# Patient Record
Sex: FEMALE | Race: WHITE | NOT HISPANIC OR LATINO | Employment: UNEMPLOYED | URBAN - NONMETROPOLITAN AREA
[De-identification: names, ages, dates, MRNs, and addresses within clinical notes are randomized per-mention and may not be internally consistent; named-entity substitution may affect disease eponyms.]

---

## 2023-04-06 ENCOUNTER — HOSPITAL ENCOUNTER (INPATIENT)
Facility: HOSPITAL | Age: 38
LOS: 2 days | Discharge: HOME | End: 2023-04-08
Attending: PSYCHIATRY & NEUROLOGY | Admitting: PSYCHIATRY & NEUROLOGY
Payer: MEDICAID

## 2023-04-06 LAB
QT INTERVAL: 372 MS
QTC INTERVAL: 415 MS

## 2023-04-06 PROCEDURE — 93010 ELECTROCARDIOGRAM REPORT: CPT | Performed by: SPECIALIST

## 2023-04-06 PROCEDURE — 93005 ELECTROCARDIOGRAM TRACING: CPT | Performed by: PSYCHIATRY & NEUROLOGY

## 2023-04-06 PROCEDURE — 99223 1ST HOSP IP/OBS HIGH 75: CPT | Performed by: PSYCHIATRY & NEUROLOGY

## 2023-04-06 RX ORDER — ECHINACEA PURPUREA EXTRACT 125 MG
2 TABLET ORAL AS NEEDED
Status: DISCONTINUED | OUTPATIENT
Start: 2023-04-06 | End: 2023-04-08 | Stop reason: HOSPADM

## 2023-04-06 RX ORDER — OXCARBAZEPINE 300 MG/1
150 TABLET, FILM COATED ORAL 2 TIMES DAILY
Status: CANCELLED | OUTPATIENT
Start: 2023-04-06

## 2023-04-06 RX ORDER — LOPERAMIDE HYDROCHLORIDE 2 MG/1
2 CAPSULE ORAL
Status: DISCONTINUED | OUTPATIENT
Start: 2023-04-06 | End: 2023-04-08 | Stop reason: HOSPADM

## 2023-04-06 RX ORDER — BENZTROPINE MESYLATE 1 MG/1
1 TABLET ORAL 2 TIMES DAILY
Status: CANCELLED | OUTPATIENT
Start: 2023-04-06

## 2023-04-06 RX ORDER — RISPERIDONE 1 MG/1
1 TABLET ORAL 3 TIMES DAILY
Status: DISCONTINUED | OUTPATIENT
Start: 2023-04-06 | End: 2023-04-06

## 2023-04-06 RX ORDER — ALUMINA, MAGNESIA, AND SIMETHICONE 2400; 2400; 240 MG/30ML; MG/30ML; MG/30ML
15 SUSPENSION ORAL EVERY 6 HOURS PRN
Status: DISCONTINUED | OUTPATIENT
Start: 2023-04-06 | End: 2023-04-08 | Stop reason: HOSPADM

## 2023-04-06 RX ORDER — ESCITALOPRAM OXALATE 10 MG/1
10 TABLET ORAL DAILY
COMMUNITY

## 2023-04-06 RX ORDER — HYDROXYZINE 50 MG/1
50 TABLET, FILM COATED ORAL EVERY 6 HOURS PRN
Status: DISCONTINUED | OUTPATIENT
Start: 2023-04-06 | End: 2023-04-08 | Stop reason: HOSPADM

## 2023-04-06 RX ORDER — HYDROXYZINE HYDROCHLORIDE 25 MG/1
25 TABLET, FILM COATED ORAL 2 TIMES DAILY
Status: CANCELLED | OUTPATIENT
Start: 2023-04-06

## 2023-04-06 RX ORDER — TRAZODONE HYDROCHLORIDE 50 MG/1
50 TABLET ORAL NIGHTLY PRN
Status: DISCONTINUED | OUTPATIENT
Start: 2023-04-06 | End: 2023-04-08 | Stop reason: HOSPADM

## 2023-04-06 RX ORDER — IBUPROFEN 400 MG/1
400 TABLET ORAL EVERY 6 HOURS PRN
Status: DISCONTINUED | OUTPATIENT
Start: 2023-04-06 | End: 2023-04-08 | Stop reason: HOSPADM

## 2023-04-06 RX ORDER — BENZONATATE 100 MG/1
100 CAPSULE ORAL 3 TIMES DAILY PRN
Status: DISCONTINUED | OUTPATIENT
Start: 2023-04-06 | End: 2023-04-08 | Stop reason: HOSPADM

## 2023-04-06 RX ORDER — RISPERIDONE 1 MG/1
1 TABLET ORAL 3 TIMES DAILY
COMMUNITY
End: 2023-04-08 | Stop reason: HOSPADM

## 2023-04-06 RX ORDER — RISPERIDONE 1 MG/1
1 TABLET ORAL NIGHTLY
Status: DISCONTINUED | OUTPATIENT
Start: 2023-04-06 | End: 2023-04-07

## 2023-04-06 RX ORDER — ESCITALOPRAM OXALATE 10 MG/1
10 TABLET ORAL DAILY
Status: DISCONTINUED | OUTPATIENT
Start: 2023-04-06 | End: 2023-04-08 | Stop reason: HOSPADM

## 2023-04-06 RX ORDER — BENZTROPINE MESYLATE 1 MG/1
2 TABLET ORAL ONCE AS NEEDED
Status: DISCONTINUED | OUTPATIENT
Start: 2023-04-06 | End: 2023-04-08 | Stop reason: HOSPADM

## 2023-04-06 RX ORDER — BENZTROPINE MESYLATE 1 MG/ML
1 INJECTION INTRAMUSCULAR; INTRAVENOUS ONCE AS NEEDED
Status: DISCONTINUED | OUTPATIENT
Start: 2023-04-06 | End: 2023-04-08 | Stop reason: HOSPADM

## 2023-04-06 RX ORDER — NICOTINE 21 MG/24HR
1 PATCH, TRANSDERMAL 24 HOURS TRANSDERMAL
Status: DISCONTINUED | OUTPATIENT
Start: 2023-04-06 | End: 2023-04-07

## 2023-04-06 RX ORDER — ONDANSETRON 4 MG/1
4 TABLET, FILM COATED ORAL EVERY 6 HOURS PRN
Status: DISCONTINUED | OUTPATIENT
Start: 2023-04-06 | End: 2023-04-08 | Stop reason: HOSPADM

## 2023-04-06 RX ORDER — FAMOTIDINE 20 MG/1
20 TABLET, FILM COATED ORAL 2 TIMES DAILY PRN
Status: DISCONTINUED | OUTPATIENT
Start: 2023-04-06 | End: 2023-04-08 | Stop reason: HOSPADM

## 2023-04-06 RX ORDER — OXCARBAZEPINE 150 MG/1
150 TABLET, FILM COATED ORAL 2 TIMES DAILY
COMMUNITY
End: 2023-04-08 | Stop reason: HOSPADM

## 2023-04-06 RX ORDER — BENZTROPINE MESYLATE 1 MG/1
1 TABLET ORAL 2 TIMES DAILY
COMMUNITY
End: 2023-04-08 | Stop reason: HOSPADM

## 2023-04-06 RX ORDER — ACETAMINOPHEN 325 MG/1
650 TABLET ORAL EVERY 6 HOURS PRN
Status: DISCONTINUED | OUTPATIENT
Start: 2023-04-06 | End: 2023-04-08 | Stop reason: HOSPADM

## 2023-04-06 RX ORDER — HYDROXYZINE PAMOATE 25 MG/1
25 CAPSULE ORAL 2 TIMES DAILY
COMMUNITY

## 2023-04-06 RX ADMIN — RISPERIDONE 1 MG: 1 TABLET, FILM COATED ORAL at 20:50

## 2023-04-06 RX ADMIN — TRAZODONE HYDROCHLORIDE 50 MG: 50 TABLET ORAL at 20:50

## 2023-04-06 RX ADMIN — ESCITALOPRAM 10 MG: 10 TABLET, FILM COATED ORAL at 12:28

## 2023-04-06 NOTE — PLAN OF CARE
Goal Outcome Evaluation:  Plan of Care Reviewed With: patient  Patient Agreement with Plan of Care: agrees     Progress: no change       New admission, Pt has been oriented to the unit and discussed safety and rules.

## 2023-04-06 NOTE — PLAN OF CARE
Goal Outcome Evaluation:  Plan of Care Reviewed With: patient  Patient Agreement with Plan of Care: agrees     Progress: no change  Outcome Evaluation: Patient calm and cooperative. Rates anxiety a 7 and depression a 8. Reports auditory and tactile hallucinations. Reports suicidal thought with no plan and agrees to talk with staff if suicida thoughts worsen.
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
## 2023-04-06 NOTE — CONSULTS
"Visited with Alba briefly this afternoon. She expressed a desire to \"find her tigre again\", and we talked about what that would look like. I prayed with her and gave her a Bible. She thanked me for the visit. Will follow as needed.  "

## 2023-04-06 NOTE — H&P
"      INITIAL PSYCHIATRIC HISTORY & PHYSICAL    Patient Identification:  Name:  Alba Pantoja  Age:  37 y.o.  Sex:  female  :  1985  MRN:  [id number]   Visit Number:  [id number]  Primary Care Physician:  Provider, No Known    SUBJECTIVE    CC/Focus of Exam: Psychosis, SI    HPI: Alba Pantoja is a 37 y.o. female who was admitted on 2023 with complaints of psychosis and recent SI.  Patient directly admitted from outside hospital.  Patient mildly disorganized, exhibiting response lag, thought blocking, circumferential speech, paranoia and anxiety on exam today.  She is very vague on multiple questions.  She recently spent some time inpatient [facility] in [place].  She reports history of disorganization, paranoia, auditory hallucinations.  She was visiting family in [facility] when symptoms worsened over the past 2 days.    PAST PSYCHIATRIC HX:  Dx: PTSD, bipolar, schizophrenia  IP: 2 previous hospitalizations in [place]  OP: [facility] in [place]  Current meds: Per med rec  Previous meds: Multiple, cannot recall  SH/SI/SA: Endorses x3  Trauma: Endorsed, but did not want to elaborate    SUBSTANCE USE HX:  Endorsed history of polysubstance abuse, but none recently.  Smokes more than 1 pack/day.  Admission UDS negative    SOCIAL HX:  Lives in [facility].  Was visiting family in [place] before coming to the hospital.  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 No " pertinent surgical history.    No medications prior to admission.         ALLERGIES:  Penicillins    Temp:  [97.1 °F (36.2 °C)-98.4 °F (36.9 °C)] 97.1 °F (36.2 °C)  Heart Rate:  [80-84] 80  Resp:  [18] 18  BP: ()/(58-78) 93/58    REVIEW OF SYSTEMS:  Review of Systems   Psychiatric/Behavioral: Positive for confusion, dysphoric mood, hallucinations, sleep disturbance and suicidal ideas. The patient is nervous/anxious.    All other systems reviewed and are negative.       OBJECTIVE    PHYSICAL EXAM:  Physical Exam  Vitals and nursing note reviewed.   Constitutional:       Appearance: She is well-developed.   HENT:      Head: Normocephalic and atraumatic.      Right Ear: External ear normal.      Left Ear: External ear normal.      Nose: Nose normal.   Eyes:      Pupils: Pupils are equal, round, and reactive to light.   Pulmonary:      Effort: Pulmonary effort is normal. No respiratory distress.      Breath sounds: Normal breath sounds.   Abdominal:      General: There is no distension.      Palpations: Abdomen is soft.   Musculoskeletal:         General: No deformity. Normal range of motion.      Cervical back: Normal range of motion and neck supple.   Skin:     General: Skin is warm.      Findings: No rash.   Neurological:      Mental Status: She is alert and oriented to person, place, and time.      Coordination: Coordination normal.         MENTAL STATUS EXAM:   Hygiene:   fair  Cooperation:  Guarded  Eye Contact:  Fair  Psychomotor Behavior:  Slow  Affect:  Blunted  Hopelessness: 7  Speech:  Minimal  Thought Process: Disorganized  Thought Content:  Bizarre  Suicidal:  Suicidal Ideation  Homicidal:  None  Hallucinations:  Auditory  Delusion:  Paranoid  Memory:  Deficits  Orientation:  Person and Place  Reliability:  poor  Insight:  Poor  Judgment:  Poor  Impulse Control:  Poor      Imaging Results (Last 24 Hours)     ** No results found for the last 24 hours. **           No results found for: GLUCOSE, BUN,  CREATININE, EGFRIFNONA, EGFRIFAFRI, BCR, POTASSIUM, CO2, CALCIUM, PROTENTOTREF, ALBUMIN, LABIL2, BILIRUBIN, AST, ALT    No results found for: WBC, HGB, HCT, MCV, PLT    ECG/EMG Results (most recent)     Procedure Component Value Units Date/Time    ECG 12 Lead Other [[id number]] Collected: 04/06/23 0426     Updated: 04/06/23 0856     QT Interval 372 ms      QTC Interval 415 ms     Narrative:      Test Reason : Baseline Cardiac Status~  Blood Pressure :   */*   mmHG  Vent. Rate :  75 BPM     Atrial Rate :  75 BPM     P-R Int : 138 ms          QRS Dur :  86 ms      QT Int : 372 ms       P-R-T Axes :  67  73  62 degrees     QTc Int : 415 ms    Normal sinus rhythm with sinus arrhythmia  Normal ECG  No previous ECGs available  Confirmed by Kiki Santacruz ([id number]) on 4/6/2023 8:56:16 AM    Referred By: JOESPH           Confirmed By: Kiki Santacruz           Brief Urine Lab Results     None          Last Urine Toxicity    There is no flowsheet data to display.         Chart, notes, vitals, labs personally reviewed.  Outside [facility] report requested, reviewed, no controlled meds filled in [place] over the last year  UDS results:  Negative  EKG tracing personally reviewed, interpreted as normal sinus rhythm, QTc interval 415  Consulted with patient's therapist regarding clinical history and treatment plan    ASSESSMENT & PLAN:    Suicidal Ideation  -SI with plan  -Admit for crisis stabilization  -SP3    Unspecified psychotic disorder  -We will request records from psychiatric provider in [place]  -Change risperidone to 1 mg nightly  -Discontinue benztropine 1 mg twice daily  -Discontinue oxcarbazepine 150 mg twice daily  -We will establish outpatient psychiatric care following hospitalization    Posttraumatic stress disorder  -Medication as above  -Continue escitalopram 10 mg daily  -Outpatient care as above      The patient has been admitted for safety and stabilization.  Patient will be monitored for suicidality daily and  maintained on Special Precautions Level 3 (q15 min checks) .  The patient will have individual and group therapy with a master's level therapist. A master treatment plan will be developed and agreed upon by the patient and his/her treatment team.  The patient's estimated length of stay in the hospital is 5-7 days.

## 2023-04-07 PROCEDURE — 99232 SBSQ HOSP IP/OBS MODERATE 35: CPT | Performed by: PSYCHIATRY & NEUROLOGY

## 2023-04-07 RX ORDER — RISPERIDONE 1 MG/1
1 TABLET ORAL EVERY 12 HOURS SCHEDULED
Status: DISCONTINUED | OUTPATIENT
Start: 2023-04-07 | End: 2023-04-08 | Stop reason: HOSPADM

## 2023-04-07 RX ADMIN — ESCITALOPRAM 10 MG: 10 TABLET, FILM COATED ORAL at 08:35

## 2023-04-07 RX ADMIN — RISPERIDONE 1 MG: 1 TABLET, FILM COATED ORAL at 13:31

## 2023-04-07 RX ADMIN — RISPERIDONE 1 MG: 1 TABLET, FILM COATED ORAL at 20:18
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
## 2023-04-07 NOTE — PLAN OF CARE
Goal Outcome Evaluation:  Plan of Care Reviewed With: patient  Patient Agreement with Plan of Care: agrees     Progress: improving       Pt was calm and cooperative this evening. Pt reported anxiety 5/10 and depression 8/10. Pt reports having auditory hallucinations throughout the day but denied SI and HI. Pt spent most of the evening in the day room watching television.

## 2023-04-07 NOTE — PLAN OF CARE
Goal Outcome Evaluation:  Plan of Care Reviewed With: patient  Patient Agreement with Plan of Care: agrees     Progress: improving   Patient rates anxiety 4 and depression 5, denies thoughts of harming self and others and denies hallucinations.
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
## 2023-04-08 VITALS
SYSTOLIC BLOOD PRESSURE: 105 MMHG | OXYGEN SATURATION: 98 % | HEIGHT: 68 IN | DIASTOLIC BLOOD PRESSURE: 65 MMHG | WEIGHT: 144.8 LBS | BODY MASS INDEX: 21.95 KG/M2 | HEART RATE: 79 BPM | RESPIRATION RATE: 18 BRPM | TEMPERATURE: 97.7 F

## 2023-04-08 PROCEDURE — 99238 HOSP IP/OBS DSCHRG MGMT 30/<: CPT | Performed by: PSYCHIATRY & NEUROLOGY

## 2023-04-08 RX ORDER — RISPERIDONE 1 MG/1
1 TABLET ORAL 2 TIMES DAILY
Qty: 60 TABLET | Refills: 0 | Status: SHIPPED | OUTPATIENT
Start: 2023-04-08

## 2023-04-08 RX ORDER — RISPERIDONE 1 MG/1
1 TABLET ORAL 2 TIMES DAILY
Qty: 60 TABLET | Refills: 0 | Status: SHIPPED | OUTPATIENT
Start: 2023-04-08 | End: 2023-04-08 | Stop reason: SDUPTHER

## 2023-04-08 RX ADMIN — RISPERIDONE 1 MG: 1 TABLET, FILM COATED ORAL at 09:34

## 2023-04-08 RX ADMIN — ESCITALOPRAM 10 MG: 10 TABLET, FILM COATED ORAL at 09:34
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
## 2023-04-08 NOTE — PLAN OF CARE
Goal Outcome Evaluation:  Plan of Care Reviewed With: patient  Patient Agreement with Plan of Care: agrees        Outcome Evaluation: Patient calm and cooperative. Denied CRISTIAN, HI, CHRIS.

## 2023-04-08 NOTE — DISCHARGE SUMMARY
"      PSYCHIATRIC DISCHARGE SUMMARY     Patient Identification:  Name:  Alba Pantoja  Age:  37 y.o.  Sex:  female  :  1985  MRN:  [id number]  Visit Number:  [id number]      Date of Admission:2023   Date of Discharge:  2023    Discharge Diagnosis:  Chronic PTSD  Brief psychotic disorder        Admission Diagnosis:  Suicidal ideation [R45.851]     Hospital Course  Patient is a 37 y.o. female presented with psychosis and recent suicidal ideation.      Patient was admitted for crisis stabilization.  Patient was evaluated by psychiatrist on a daily basis and had the opportunity for both group and individual therapy by master's level therapist.  Routine laboratory studies and EKG were performed.    Patient is on Risperdal was changed to 1 mg twice daily and Cogentin and Trileptal were discontinued.  Home Lexapro was continued.  Patient improved in psychotic symptoms and mood symptoms over the duration of her stay.  She was not having any concerning or bizarre behavior during examination and her evaluation and conversation was normal.  She did have high anxiety and reported multiple stressors and did get tangential at times due to anxiety and racing thoughts but was improving and denied adamantly and convincingly SI/HI/AVH.  She was considering discharge yesterday but did not have a safe and appropriate discharge plan but is able to get back home to [place] today via [employer] and with her improvement in symptoms and benign exam, was found appropriate for discharge.  She was encouraged to follow-up with mental health care including therapy at discharge and patient was understanding and amenable to this.      Mental Status Exam upon discharge:   Mood \"Just nervous\"   Affect-congruent, appropriate, stable  Thought Content-goal directed, no delusional material present  Thought process-linear, organized, improved  Suicidality: No SI  Homicidality: No HI  Perception: No AH/VH  Insight and Judgement: Fair "     Procedures Performed         Consults:   Consults     No orders found from 3/8/2023 to 4/7/2023.          Pertinent Test Results:  Lab Results (last 72 hours)     ** No results found for the last 72 hours. **            ECG/EMG Results (most recent)     Procedure Component Value Units Date/Time    ECG 12 Lead Other [[id number]] Collected: 04/06/23 0426     Updated: 04/06/23 0856     QT Interval 372 ms      QTC Interval 415 ms     Narrative:      Test Reason : Baseline Cardiac Status~  Blood Pressure :   */*   mmHG  Vent. Rate :  75 BPM     Atrial Rate :  75 BPM     P-R Int : 138 ms          QRS Dur :  86 ms      QT Int : 372 ms       P-R-T Axes :  67  73  62 degrees     QTc Int : 415 ms    Normal sinus rhythm with sinus arrhythmia  Normal ECG  No previous ECGs available  Confirmed by Kiki Santacruz ([id number]) on 4/6/2023 8:56:16 AM    Referred By: JOESPH           Confirmed By: Kiki Santacruz           EKG: normal EKG, normal sinus rhythm.    Condition on Discharge:  stable    Vital Signs  Temp:  [97.7 °F (36.5 °C)-98.1 °F (36.7 °C)] 97.7 °F (36.5 °C)  Heart Rate:  [79-90] 79  Resp:  [16-18] 18  BP: (105-116)/(65-71) 105/65    Discharge Disposition:  Home or Self Care    Discharge Medications:     Discharge Medications      Changes to Medications      Instructions Start Date   risperiDONE 1 MG tablet  Commonly known as: risperDAL  What changed: when to take this   1 mg, Oral, 2 Times Daily         Continue These Medications      Instructions Start Date   benztropine 1 MG tablet  Commonly known as: COGENTIN   1 mg, Oral, 2 Times Daily      escitalopram 10 MG tablet  Commonly known as: LEXAPRO   10 mg, Oral, Daily      hydrOXYzine pamoate 25 MG capsule  Commonly known as: VISTARIL   25 mg, Oral, 2 Times Daily         Stop These Medications    OXcarbazepine 150 MG tablet  Commonly known as: TRILEPTAL            Discharge Diet:  Regular    Activity at Discharge:  As tolerated    Follow-up Appointments  No future  appointments.      Test Results Pending at Discharge   None    I spent a total of 20 minutes face-to-face with the patient regarding discharge planning, evaluation, discussion, and follow-up plans.        Clinician:   Gabo Garcia MD  04/08/23  15:06 EDT

## 2023-04-08 NOTE — CASE MANAGEMENT/SOCIAL WORK
Therapist received a call from Dr. Garcia stating that patient was requesting discharge today.  Patient appears to be stable today and not meeting hold criteria.  Patient denying suicidal and homicidal ideation.  Patient does not appear to be responding to internal stimuli and denies hallucinations.  Patient reports decrease in depression and anxiety today.  Patient reporting no further issues.  Patient requesting to return home to spend time with her family for the holiday.  Discussed patient with lead nurse and patient's attending nurse who were able to assist patient to obtain a bus ticket to return home to [place] where patient reports her uncle will be picking her up from the [employer] station in [place].  Patient received a ride from [employer] to the [employer] station in [place] today.

## 2023-04-09 NOTE — PAYOR COMM NOTE
"Alba Pantoja (37 y.o. Female)     Date of Birth   1985    Social Security Number       Address   [address] [place] [facility]    Home Phone   [phone]    MRN   [id number]       Tenriism   Unknown    Marital Status   Single                            Admission Date   23    Admission Type   Urgent    Admitting Provider   Raz Vargas MD    Attending Provider       Department, Room/Bed   [facility] ADULT PSYCHIATRIC, 1013/2S       Discharge Date   2023    Discharge Disposition   Home or Self Care    Discharge Destination                               Attending Provider: (none)   Allergies: Penicillins    Isolation: None   Infection: None   Code Status: Prior    Ht: 172.7 cm (68\")   Wt: 65.7 kg (144 lb 12.8 oz)    Admission Cmt: None   Principal Problem: None                Active Insurance as of 2023     Primary Coverage     Payor Plan Insurance Group Employer/Plan Group    [employer] MEDICA[employer] [facility] -[employer] [id number]     Payor Plan Address Payor Plan Phone Number Payor Plan Fax Number Effective Dates    MAIL STOP:   2020 - None Entered    [address] [address]       [place] [id number]       Subscriber Name Subscriber Birth Date Member ID       ALBA PANTOJA 1985 [id number]                 Emergency Contacts      (Rel.) Home Phone Work Phone Mobile Phone    danni reynoso (Mother) [phone] -- --      After care Appt:  [address] -8642  She will be calling once she get [place] [facility]         Discharge Summary      Gabo Garcia MD at 23 1506                PSYCHIATRIC DISCHARGE SUMMARY     Patient Identification:  Name:  Alba Pantoja  Age:  37 y.o.  Sex:  female  :  1985  MRN:  [id number]  Visit Number:  [id number]      Date of Admission:2023   Date of Discharge:  2023    Discharge Diagnosis:  Chronic PTSD  Brief " "psychotic disorder        Admission Diagnosis:  Suicidal ideation [R45.851]     Hospital Course  Patient is a 37 y.o. female presented with psychosis and recent suicidal ideation.      Patient was admitted for crisis stabilization.  Patient was evaluated by psychiatrist on a daily basis and had the opportunity for both group and individual therapy by master's level therapist.  Routine laboratory studies and EKG were performed.    Patient is on Risperdal was changed to 1 mg twice daily and Cogentin and Trileptal were discontinued.  Home Lexapro was continued.  Patient improved in psychotic symptoms and mood symptoms over the duration of her stay.  She was not having any concerning or bizarre behavior during examination and her evaluation and conversation was normal.  She did have high anxiety and reported multiple stressors and did get tangential at times due to anxiety and racing thoughts but was improving and denied adamantly and convincingly SI/HI/AVH.  She was considering discharge yesterday but did not have a safe and appropriate discharge plan but is able to get back home to [place] today via [employer] and with her improvement in symptoms and benign exam, was found appropriate for discharge.  She was encouraged to follow-up with mental health care including therapy at discharge and patient was understanding and amenable to this.      Mental Status Exam upon discharge:   Mood \"Just nervous\"   Affect-congruent, appropriate, stable  Thought Content-goal directed, no delusional material present  Thought process-linear, organized, improved  Suicidality: No SI  Homicidality: No HI  Perception: No AH/VH  Insight and Judgement: Fair     Procedures Performed         Consults:   Consults     No orders found from 3/8/2023 to 4/7/2023.          Pertinent Test Results:  Lab Results (last 72 hours)     ** No results found for the last 72 hours. **            ECG/EMG Results (most recent)     Procedure Component Value Units " Date/Time    ECG 12 Lead Other [[id number]] Collected: 04/06/23 0426     Updated: 04/06/23 0856     QT Interval 372 ms      QTC Interval 415 ms     Narrative:      Test Reason : Baseline Cardiac Status~  Blood Pressure :   */*   mmHG  Vent. Rate :  75 BPM     Atrial Rate :  75 BPM     P-R Int : 138 ms          QRS Dur :  86 ms      QT Int : 372 ms       P-R-T Axes :  67  73  62 degrees     QTc Int : 415 ms    Normal sinus rhythm with sinus arrhythmia  Normal ECG  No previous ECGs available  Confirmed by Kiki Santacruz ([id number]) on 4/6/2023 8:56:16 AM    Referred By: JOESPH           Confirmed By: Kiki Santacruz           EKG: normal EKG, normal sinus rhythm.    Condition on Discharge:  stable    Vital Signs  Temp:  [97.7 °F (36.5 °C)-98.1 °F (36.7 °C)] 97.7 °F (36.5 °C)  Heart Rate:  [79-90] 79  Resp:  [16-18] 18  BP: (105-116)/(65-71) 105/65    Discharge Disposition:  Home or Self Care    Discharge Medications:     Discharge Medications      Changes to Medications      Instructions Start Date   risperiDONE 1 MG tablet  Commonly known as: risperDAL  What changed: when to take this   1 mg, Oral, 2 Times Daily         Continue These Medications      Instructions Start Date   benztropine 1 MG tablet  Commonly known as: COGENTIN   1 mg, Oral, 2 Times Daily      escitalopram 10 MG tablet  Commonly known as: LEXAPRO   10 mg, Oral, Daily      hydrOXYzine pamoate 25 MG capsule  Commonly known as: VISTARIL   25 mg, Oral, 2 Times Daily         Stop These Medications    OXcarbazepine 150 MG tablet  Commonly known as: TRILEPTAL            Discharge Diet:  Regular    Activity at Discharge:  As tolerated    Follow-up Appointments  No future appointments.      Test Results Pending at Discharge   None    I spent a total of 20 minutes face-to-face with the patient regarding discharge planning, evaluation, discussion, and follow-up plans.        Clinician:   Gabo Garcia MD  04/08/23  15:06 EDT        Electronically signed by Jose  MD Gbao at 04/08/23 0744

## 2023-04-10 NOTE — PAYOR COMM NOTE
"Alba Pantoja (37 y.o. Female)     Date of Birth   1985    Social Security Number       Address   [address] [place] [address]    Home Phone   [phone]    MRN   [id number]       Sikhism   Unknown    Marital Status   Single                            Admission Date   23    Admission Type   Urgent    Admitting Provider   Raz Vargas MD    Attending Provider       Department, Room/Bed   [facility] ADULT PSYCHIATRIC, 1013/2S       Discharge Date   2023    Discharge Disposition   Home or Self Care    Discharge Destination                               Attending Provider: (none)   Allergies: Penicillins    Isolation: None   Infection: None   Code Status: Prior    Ht: 172.7 cm (68\")   Wt: 65.7 kg (144 lb 12.8 oz)    Admission Cmt: None   Principal Problem: None                Active Insurance as of 2023     Primary Coverage     Payor Plan Insurance Group Employer/Plan Group    ANTHEM MEDICAID [facility] -[employer]M [id number]     Payor Plan Address Payor Plan Phone Number Payor Plan Fax Number Effective Dates    MAIL STOP:   2020 - None Entered    [address] [address]       [place] [phone]       Subscriber Name Subscriber Birth Date Member ID       ALBA PANTOJA 1985 [id number]                 Emergency Contacts      (Rel.) Home Phone Work Phone Mobile Phone    danni reynoso (Mother) [phone] -- --        After care follow up   [facility]  [phone]  When she returns home        Discharge Summary      Gabo Garcia MD at 23 1506                PSYCHIATRIC DISCHARGE SUMMARY     Patient Identification:  Name:  Alba Pantoja  Age:  37 y.o.  Sex:  female  :  1985  MRN:  [id number]  Visit Number:  [id number]      Date of Admission:2023   Date of Discharge:  2023    Discharge Diagnosis:  Chronic PTSD  Brief psychotic disorder        Admission Diagnosis:  Suicidal " "ideation [R45.857]     Hospital Course  Patient is a 37 y.o. female presented with psychosis and recent suicidal ideation.      Patient was admitted for crisis stabilization.  Patient was evaluated by psychiatrist on a daily basis and had the opportunity for both group and individual therapy by master's level therapist.  Routine laboratory studies and EKG were performed.    Patient is on Risperdal was changed to 1 mg twice daily and Cogentin and Trileptal were discontinued.  Home Lexapro was continued.  Patient improved in psychotic symptoms and mood symptoms over the duration of her stay.  She was not having any concerning or bizarre behavior during examination and her evaluation and conversation was normal.  She did have high anxiety and reported multiple stressors and did get tangential at times due to anxiety and racing thoughts but was improving and denied adamantly and convincingly SI/HI/AVH.  She was considering discharge yesterday but did not have a safe and appropriate discharge plan but is able to get back home to [place] today via [employer] and with her improvement in symptoms and benign exam, was found appropriate for discharge.  She was encouraged to follow-up with mental health care including therapy at discharge and patient was understanding and amenable to this.      Mental Status Exam upon discharge:   Mood \"Just nervous\"   Affect-congruent, appropriate, stable  Thought Content-goal directed, no delusional material present  Thought process-linear, organized, improved  Suicidality: No SI  Homicidality: No HI  Perception: No AH/VH  Insight and Judgement: Fair     Procedures Performed         Consults:   Consults     No orders found from 3/8/2023 to 4/7/2023.          Pertinent Test Results:  Lab Results (last 72 hours)     ** No results found for the last 72 hours. **            ECG/EMG Results (most recent)     Procedure Component Value Units Date/Time    ECG 12 Lead Other [[id number]] Collected: " 04/06/23 0426     Updated: 04/06/23 0856     QT Interval 372 ms      QTC Interval 415 ms     Narrative:      Test Reason : Baseline Cardiac Status~  Blood Pressure :   */*   mmHG  Vent. Rate :  75 BPM     Atrial Rate :  75 BPM     P-R Int : 138 ms          QRS Dur :  86 ms      QT Int : 372 ms       P-R-T Axes :  67  73  62 degrees     QTc Int : 415 ms    Normal sinus rhythm with sinus arrhythmia  Normal ECG  No previous ECGs available  Confirmed by Kiki Santacruz ([id number]) on 4/6/2023 8:56:16 AM    Referred By: JOESPH           Confirmed By: Kiki Santacruz           EKG: normal EKG, normal sinus rhythm.    Condition on Discharge:  stable    Vital Signs  Temp:  [97.7 °F (36.5 °C)-98.1 °F (36.7 °C)] 97.7 °F (36.5 °C)  Heart Rate:  [79-90] 79  Resp:  [16-18] 18  BP: (105-116)/(65-71) 105/65    Discharge Disposition:  Home or Self Care    Discharge Medications:     Discharge Medications      Changes to Medications      Instructions Start Date   risperiDONE 1 MG tablet  Commonly known as: risperDAL  What changed: when to take this   1 mg, Oral, 2 Times Daily         Continue These Medications      Instructions Start Date   benztropine 1 MG tablet  Commonly known as: COGENTIN   1 mg, Oral, 2 Times Daily      escitalopram 10 MG tablet  Commonly known as: LEXAPRO   10 mg, Oral, Daily      hydrOXYzine pamoate 25 MG capsule  Commonly known as: VISTARIL   25 mg, Oral, 2 Times Daily         Stop These Medications    OXcarbazepine 150 MG tablet  Commonly known as: TRILEPTAL            Discharge Diet:  Regular    Activity at Discharge:  As tolerated    Follow-up Appointments  No future appointments.      Test Results Pending at Discharge   None    I spent a total of 20 minutes face-to-face with the patient regarding discharge planning, evaluation, discussion, and follow-up plans.        Clinician:   Gabo Garcia MD  04/08/23  15:06 EDT        Electronically signed by Gabo Garcia MD at 04/08/23 6216